# Patient Record
Sex: FEMALE | URBAN - METROPOLITAN AREA
[De-identification: names, ages, dates, MRNs, and addresses within clinical notes are randomized per-mention and may not be internally consistent; named-entity substitution may affect disease eponyms.]

---

## 2023-09-05 ENCOUNTER — OFFICE (OUTPATIENT)
Dept: URBAN - METROPOLITAN AREA CLINIC 121 | Facility: CLINIC | Age: 43
Setting detail: OPHTHALMOLOGY
End: 2023-09-05
Payer: COMMERCIAL

## 2023-09-05 DIAGNOSIS — H02.011: ICD-10-CM

## 2023-09-05 DIAGNOSIS — H02.014: ICD-10-CM

## 2023-09-05 DIAGNOSIS — Q10.3: ICD-10-CM

## 2023-09-05 DIAGNOSIS — H16.223: ICD-10-CM

## 2023-09-05 DIAGNOSIS — H18.831: ICD-10-CM

## 2023-09-05 DIAGNOSIS — H40.013: ICD-10-CM

## 2023-09-05 DIAGNOSIS — S05.02XA: ICD-10-CM

## 2023-09-05 PROCEDURE — 92012 INTRM OPH EXAM EST PATIENT: CPT | Performed by: OPHTHALMOLOGY

## 2023-09-05 ASSESSMENT — LID EXAM ASSESSMENTS
OS_TRICHIASIS: LUL
OS_COMMENTS: DISTICHIASIS LLL & LUL
OD_COMMENTS: DISTICHIASIS RLL & RUL
OD_TRICHIASIS: RUL

## 2023-09-05 ASSESSMENT — KERATOMETRY
OD_AXISANGLE_DEGREES: 096
OS_K2POWER_DIOPTERS: 45.75
OS_AXISANGLE_DEGREES: 061
OD_K1POWER_DIOPTERS: 45.25
OS_K1POWER_DIOPTERS: 45.00
OD_K2POWER_DIOPTERS: 46.00
METHOD_AUTO_MANUAL: AUTO

## 2023-09-05 ASSESSMENT — REFRACTION_AUTOREFRACTION
OS_AXIS: 023
OS_SPHERE: -1.25
OD_CYLINDER: +0.50
OD_AXIS: 053
OS_CYLINDER: +0.50
OD_SPHERE: -0.75

## 2023-09-05 ASSESSMENT — SUPERFICIAL PUNCTATE KERATITIS (SPK)
OD_SPK: T
OS_SPK: T

## 2023-09-05 ASSESSMENT — VISUAL ACUITY
OS_BCVA: 20/30
OD_BCVA: 20/25

## 2023-09-05 ASSESSMENT — CONFRONTATIONAL VISUAL FIELD TEST (CVF)
OD_FINDINGS: FULL
OS_FINDINGS: FULL

## 2023-09-05 ASSESSMENT — CORNEAL TRAUMA - ABRASION: OD_ABRASION: PRESENT

## 2023-09-05 ASSESSMENT — AXIALLENGTH_DERIVED
OD_AL: 23.0204
OS_AL: 23.2965

## 2023-09-05 ASSESSMENT — SPHEQUIV_DERIVED
OD_SPHEQUIV: -0.5
OS_SPHEQUIV: -1

## 2023-09-08 ENCOUNTER — OFFICE (OUTPATIENT)
Dept: URBAN - METROPOLITAN AREA CLINIC 121 | Facility: CLINIC | Age: 43
Setting detail: OPHTHALMOLOGY
End: 2023-09-08
Payer: COMMERCIAL

## 2023-09-08 DIAGNOSIS — H40.013: ICD-10-CM

## 2023-09-08 DIAGNOSIS — Q10.3: ICD-10-CM

## 2023-09-08 DIAGNOSIS — H02.012: ICD-10-CM

## 2023-09-08 DIAGNOSIS — H02.011: ICD-10-CM

## 2023-09-08 DIAGNOSIS — H18.831: ICD-10-CM

## 2023-09-08 DIAGNOSIS — H16.223: ICD-10-CM

## 2023-09-08 DIAGNOSIS — H02.015: ICD-10-CM

## 2023-09-08 DIAGNOSIS — H02.014: ICD-10-CM

## 2023-09-08 DIAGNOSIS — S05.02XA: ICD-10-CM

## 2023-09-08 PROCEDURE — 67820 REVISE EYELASHES: CPT | Performed by: OPHTHALMOLOGY

## 2023-09-08 PROCEDURE — 92012 INTRM OPH EXAM EST PATIENT: CPT | Performed by: OPHTHALMOLOGY

## 2023-09-08 ASSESSMENT — CONFRONTATIONAL VISUAL FIELD TEST (CVF)
OS_FINDINGS: FULL
OD_FINDINGS: FULL

## 2023-09-08 ASSESSMENT — LID EXAM ASSESSMENTS
OS_COMMENTS: DISTICHIASIS LLL & LUL
OD_TRICHIASIS: RUL
OS_TRICHIASIS: LUL
OD_COMMENTS: DISTICHIASIS RLL & RUL

## 2023-09-08 ASSESSMENT — KERATOMETRY
OD_K1POWER_DIOPTERS: 45.25
OS_AXISANGLE_DEGREES: 061
OD_AXISANGLE_DEGREES: 096
OD_K2POWER_DIOPTERS: 46.00
OS_K2POWER_DIOPTERS: 45.75
OS_K1POWER_DIOPTERS: 45.00
METHOD_AUTO_MANUAL: AUTO

## 2023-09-08 ASSESSMENT — VISUAL ACUITY
OS_BCVA: 20/30
OD_BCVA: 20/25

## 2023-09-08 ASSESSMENT — REFRACTION_AUTOREFRACTION
OS_SPHERE: -1.25
OD_CYLINDER: +0.50
OD_SPHERE: -0.75
OS_CYLINDER: +0.50
OS_AXIS: 023
OD_AXIS: 053

## 2023-09-08 ASSESSMENT — AXIALLENGTH_DERIVED
OD_AL: 23.0204
OS_AL: 23.2965

## 2023-09-08 ASSESSMENT — SUPERFICIAL PUNCTATE KERATITIS (SPK)
OS_SPK: T
OD_SPK: T

## 2023-09-08 ASSESSMENT — SPHEQUIV_DERIVED
OS_SPHEQUIV: -1
OD_SPHEQUIV: -0.5

## 2023-09-08 ASSESSMENT — CORNEAL TRAUMA - ABRASION: OD_ABRASION: PRESENT

## 2023-09-11 ENCOUNTER — OFFICE (OUTPATIENT)
Dept: URBAN - METROPOLITAN AREA CLINIC 121 | Facility: CLINIC | Age: 43
Setting detail: OPHTHALMOLOGY
End: 2023-09-11
Payer: COMMERCIAL

## 2023-09-11 DIAGNOSIS — H02.015: ICD-10-CM

## 2023-09-11 DIAGNOSIS — H02.011: ICD-10-CM

## 2023-09-11 DIAGNOSIS — H40.013: ICD-10-CM

## 2023-09-11 DIAGNOSIS — H02.014: ICD-10-CM

## 2023-09-11 DIAGNOSIS — H02.012: ICD-10-CM

## 2023-09-11 DIAGNOSIS — H16.223: ICD-10-CM

## 2023-09-11 DIAGNOSIS — Q10.3: ICD-10-CM

## 2023-09-11 DIAGNOSIS — H18.831: ICD-10-CM

## 2023-09-11 DIAGNOSIS — S05.02XD: ICD-10-CM

## 2023-09-11 PROCEDURE — 92012 INTRM OPH EXAM EST PATIENT: CPT | Performed by: OPHTHALMOLOGY

## 2023-09-11 ASSESSMENT — KERATOMETRY
METHOD_AUTO_MANUAL: AUTO
OD_K1POWER_DIOPTERS: 45.25
OS_K2POWER_DIOPTERS: 45.75
OD_K2POWER_DIOPTERS: 46.00
OS_AXISANGLE_DEGREES: 061
OD_AXISANGLE_DEGREES: 096
OS_K1POWER_DIOPTERS: 45.00

## 2023-09-11 ASSESSMENT — VISUAL ACUITY
OD_BCVA: 20/25
OS_BCVA: 20/30

## 2023-09-11 ASSESSMENT — SPHEQUIV_DERIVED
OS_SPHEQUIV: -1
OD_SPHEQUIV: -0.5

## 2023-09-11 ASSESSMENT — AXIALLENGTH_DERIVED
OS_AL: 23.2965
OD_AL: 23.0204

## 2023-09-11 ASSESSMENT — SUPERFICIAL PUNCTATE KERATITIS (SPK)
OD_SPK: T
OS_SPK: T

## 2023-09-11 ASSESSMENT — LID EXAM ASSESSMENTS
OD_COMMENTS: DISTICHIASIS RLL & RUL
OS_COMMENTS: DISTICHIASIS LLL & LUL
OS_TRICHIASIS: LUL
OD_TRICHIASIS: RUL

## 2023-09-11 ASSESSMENT — REFRACTION_AUTOREFRACTION
OS_SPHERE: -1.25
OD_AXIS: 053
OD_SPHERE: -0.75
OD_CYLINDER: +0.50
OS_AXIS: 023
OS_CYLINDER: +0.50

## 2023-09-11 ASSESSMENT — CONFRONTATIONAL VISUAL FIELD TEST (CVF)
OS_FINDINGS: FULL
OD_FINDINGS: FULL

## 2023-09-11 ASSESSMENT — CORNEAL TRAUMA - ABRASION: OD_ABRASION: PRESENT

## 2023-10-17 ENCOUNTER — OFFICE (OUTPATIENT)
Dept: URBAN - METROPOLITAN AREA CLINIC 121 | Facility: CLINIC | Age: 43
Setting detail: OPHTHALMOLOGY
End: 2023-10-17
Payer: COMMERCIAL

## 2023-10-17 DIAGNOSIS — H02.015: ICD-10-CM

## 2023-10-17 DIAGNOSIS — H16.223: ICD-10-CM

## 2023-10-17 DIAGNOSIS — H40.013: ICD-10-CM

## 2023-10-17 DIAGNOSIS — H02.014: ICD-10-CM

## 2023-10-17 DIAGNOSIS — Q10.3: ICD-10-CM

## 2023-10-17 DIAGNOSIS — H02.012: ICD-10-CM

## 2023-10-17 DIAGNOSIS — H02.011: ICD-10-CM

## 2023-10-17 PROCEDURE — 92012 INTRM OPH EXAM EST PATIENT: CPT | Performed by: OPHTHALMOLOGY

## 2023-10-17 ASSESSMENT — CONFRONTATIONAL VISUAL FIELD TEST (CVF)
OS_FINDINGS: FULL
OD_FINDINGS: FULL

## 2023-10-17 ASSESSMENT — KERATOMETRY
OS_K2POWER_DIOPTERS: 45.75
OD_K1POWER_DIOPTERS: 45.25
OD_K2POWER_DIOPTERS: 46.00
OS_AXISANGLE_DEGREES: 061
METHOD_AUTO_MANUAL: AUTO
OS_K1POWER_DIOPTERS: 45.00
OD_AXISANGLE_DEGREES: 096

## 2023-10-17 ASSESSMENT — AXIALLENGTH_DERIVED
OD_AL: 23.0204
OS_AL: 23.2965

## 2023-10-17 ASSESSMENT — SUPERFICIAL PUNCTATE KERATITIS (SPK)
OS_SPK: T
OD_SPK: T

## 2023-10-17 ASSESSMENT — SPHEQUIV_DERIVED
OS_SPHEQUIV: -1
OD_SPHEQUIV: -0.5

## 2023-10-17 ASSESSMENT — LID EXAM ASSESSMENTS
OD_TRICHIASIS: RUL
OS_COMMENTS: DISTICHIASIS LLL & LUL
OS_TRICHIASIS: LUL
OD_COMMENTS: DISTICHIASIS RLL & RUL

## 2023-10-17 ASSESSMENT — REFRACTION_AUTOREFRACTION
OS_CYLINDER: +0.50
OD_SPHERE: -0.75
OD_AXIS: 053
OS_AXIS: 023
OS_SPHERE: -1.25
OD_CYLINDER: +0.50

## 2023-10-17 ASSESSMENT — VISUAL ACUITY
OS_BCVA: 20/30
OD_BCVA: 20/25

## 2023-10-17 ASSESSMENT — CORNEAL TRAUMA - ABRASION: OD_ABRASION: PRESENT

## 2023-12-15 ENCOUNTER — OFFICE (OUTPATIENT)
Dept: URBAN - METROPOLITAN AREA CLINIC 121 | Facility: CLINIC | Age: 43
Setting detail: OPHTHALMOLOGY
End: 2023-12-15
Payer: COMMERCIAL

## 2023-12-15 DIAGNOSIS — H16.223: ICD-10-CM

## 2023-12-15 DIAGNOSIS — Q10.3: ICD-10-CM

## 2023-12-15 DIAGNOSIS — H02.011: ICD-10-CM

## 2023-12-15 DIAGNOSIS — H02.012: ICD-10-CM

## 2023-12-15 DIAGNOSIS — H02.015: ICD-10-CM

## 2023-12-15 DIAGNOSIS — H02.014: ICD-10-CM

## 2023-12-15 DIAGNOSIS — H40.013: ICD-10-CM

## 2023-12-15 PROCEDURE — 92012 INTRM OPH EXAM EST PATIENT: CPT | Performed by: OPHTHALMOLOGY

## 2023-12-15 ASSESSMENT — REFRACTION_AUTOREFRACTION
OD_SPHERE: -0.75
OS_SPHERE: -1.25
OD_AXIS: 053
OS_AXIS: 023
OD_CYLINDER: +0.50
OS_CYLINDER: +0.50

## 2023-12-15 ASSESSMENT — SUPERFICIAL PUNCTATE KERATITIS (SPK)
OD_SPK: T
OS_SPK: T

## 2023-12-15 ASSESSMENT — CONFRONTATIONAL VISUAL FIELD TEST (CVF)
OD_FINDINGS: FULL
OS_FINDINGS: FULL

## 2023-12-15 ASSESSMENT — SPHEQUIV_DERIVED
OS_SPHEQUIV: -1
OD_SPHEQUIV: -0.5

## 2023-12-15 ASSESSMENT — LID EXAM ASSESSMENTS
OD_TRICHIASIS: RUL
OD_COMMENTS: DISTICHIASIS RLL & RUL
OS_TRICHIASIS: LUL
OS_COMMENTS: DISTICHIASIS LLL & LUL

## 2024-04-12 ENCOUNTER — OFFICE (OUTPATIENT)
Dept: URBAN - METROPOLITAN AREA CLINIC 121 | Facility: CLINIC | Age: 44
Setting detail: OPHTHALMOLOGY
End: 2024-04-12
Payer: COMMERCIAL

## 2024-04-12 DIAGNOSIS — H40.013: ICD-10-CM

## 2024-04-12 PROCEDURE — 99212 OFFICE O/P EST SF 10 MIN: CPT | Performed by: OPHTHALMOLOGY

## 2024-04-12 PROCEDURE — 92133 CPTRZD OPH DX IMG PST SGM ON: CPT | Performed by: OPHTHALMOLOGY

## 2024-04-12 PROCEDURE — 92083 EXTENDED VISUAL FIELD XM: CPT | Performed by: OPHTHALMOLOGY

## 2024-04-12 ASSESSMENT — LID EXAM ASSESSMENTS
OD_COMMENTS: DISTICHIASIS RLL & RUL
OD_TRICHIASIS: RUL
OS_TRICHIASIS: LUL
OS_COMMENTS: DISTICHIASIS LLL & LUL

## 2024-08-27 ENCOUNTER — NON-APPOINTMENT (OUTPATIENT)
Age: 44
End: 2024-08-27

## 2024-08-28 ENCOUNTER — OFFICE (OUTPATIENT)
Dept: URBAN - METROPOLITAN AREA CLINIC 121 | Facility: CLINIC | Age: 44
Setting detail: OPHTHALMOLOGY
End: 2024-08-28
Payer: COMMERCIAL

## 2024-08-28 ENCOUNTER — RX ONLY (RX ONLY)
Age: 44
End: 2024-08-28

## 2024-08-28 ENCOUNTER — TRANSCRIPTION ENCOUNTER (OUTPATIENT)
Age: 44
End: 2024-08-28

## 2024-08-28 ENCOUNTER — APPOINTMENT (OUTPATIENT)
Dept: PULMONOLOGY | Facility: CLINIC | Age: 44
End: 2024-08-28
Payer: COMMERCIAL

## 2024-08-28 VITALS
TEMPERATURE: 97.9 F | WEIGHT: 250 LBS | BODY MASS INDEX: 42.68 KG/M2 | HEART RATE: 80 BPM | HEIGHT: 64 IN | RESPIRATION RATE: 16 BRPM | DIASTOLIC BLOOD PRESSURE: 72 MMHG | OXYGEN SATURATION: 97 % | SYSTOLIC BLOOD PRESSURE: 122 MMHG

## 2024-08-28 DIAGNOSIS — H16.223: ICD-10-CM

## 2024-08-28 DIAGNOSIS — H02.015: ICD-10-CM

## 2024-08-28 DIAGNOSIS — J45.40 MODERATE PERSISTENT ASTHMA, UNCOMPLICATED: ICD-10-CM

## 2024-08-28 DIAGNOSIS — G47.33 OBSTRUCTIVE SLEEP APNEA (ADULT) (PEDIATRIC): ICD-10-CM

## 2024-08-28 DIAGNOSIS — Z86.69 PERSONAL HISTORY OF OTHER DISEASES OF THE NERVOUS SYSTEM AND SENSE ORGANS: ICD-10-CM

## 2024-08-28 DIAGNOSIS — Q10.3: ICD-10-CM

## 2024-08-28 DIAGNOSIS — A63.0 ANOGENITAL (VENEREAL) WARTS: ICD-10-CM

## 2024-08-28 DIAGNOSIS — F32.A DEPRESSION, UNSPECIFIED: ICD-10-CM

## 2024-08-28 DIAGNOSIS — M72.2 PLANTAR FASCIAL FIBROMATOSIS: ICD-10-CM

## 2024-08-28 DIAGNOSIS — M67.859: ICD-10-CM

## 2024-08-28 DIAGNOSIS — Z87.42 PERSONAL HISTORY OF OTHER DISEASES OF THE FEMALE GENITAL TRACT: ICD-10-CM

## 2024-08-28 DIAGNOSIS — H10.45: ICD-10-CM

## 2024-08-28 DIAGNOSIS — R06.83 SNORING: ICD-10-CM

## 2024-08-28 DIAGNOSIS — H40.013: ICD-10-CM

## 2024-08-28 DIAGNOSIS — F41.9 ANXIETY DISORDER, UNSPECIFIED: ICD-10-CM

## 2024-08-28 DIAGNOSIS — H02.012: ICD-10-CM

## 2024-08-28 DIAGNOSIS — I47.10 SUPRAVENTRICULAR TACHYCARDIA, UNSPECIFIED: ICD-10-CM

## 2024-08-28 DIAGNOSIS — H02.014: ICD-10-CM

## 2024-08-28 DIAGNOSIS — Z87.39 PERSONAL HISTORY OF OTHER DISEASES OF THE MUSCULOSKELETAL SYSTEM AND CONNECTIVE TISSUE: ICD-10-CM

## 2024-08-28 DIAGNOSIS — H02.011: ICD-10-CM

## 2024-08-28 PROBLEM — Z00.00 ENCOUNTER FOR PREVENTIVE HEALTH EXAMINATION: Status: ACTIVE | Noted: 2024-08-28

## 2024-08-28 PROCEDURE — 92014 COMPRE OPH EXAM EST PT 1/>: CPT | Performed by: OPHTHALMOLOGY

## 2024-08-28 PROCEDURE — 99203 OFFICE O/P NEW LOW 30 MIN: CPT

## 2024-08-28 RX ORDER — ALPRAZOLAM 0.25 MG/1
0.25 TABLET ORAL
Refills: 0 | Status: ACTIVE | COMMUNITY

## 2024-08-28 RX ORDER — TRAMADOL HYDROCHLORIDE 25 MG/1
TABLET, COATED ORAL
Refills: 0 | Status: ACTIVE | COMMUNITY

## 2024-08-28 RX ORDER — LEVALBUTEROL TARTRATE 45 UG/1
45 AEROSOL, METERED ORAL
Refills: 0 | Status: ACTIVE | COMMUNITY

## 2024-08-28 RX ORDER — MULTIVIT-MIN/IRON/FOLIC ACID/K 18-600-40
50 MCG CAPSULE ORAL DAILY
Refills: 0 | Status: ACTIVE | COMMUNITY

## 2024-08-28 RX ORDER — SERTRALINE HYDROCHLORIDE 50 MG/1
50 TABLET, FILM COATED ORAL DAILY
Refills: 0 | Status: ACTIVE | COMMUNITY

## 2024-08-28 RX ORDER — TIZANIDINE HYDROCHLORIDE 4 MG/1
4 CAPSULE ORAL
Refills: 0 | Status: ACTIVE | COMMUNITY

## 2024-08-28 RX ORDER — MONTELUKAST 10 MG/1
10 TABLET, FILM COATED ORAL DAILY
Refills: 0 | Status: ACTIVE | COMMUNITY

## 2024-08-28 RX ORDER — BUDESONIDE AND FORMOTEROL FUMARATE DIHYDRATE 160; 4.5 UG/1; UG/1
160-4.5 AEROSOL RESPIRATORY (INHALATION) TWICE DAILY
Refills: 0 | Status: ACTIVE | COMMUNITY

## 2024-08-28 RX ORDER — MULTIVIT-MIN/IRON/FOLIC ACID/K 18-600-40
400 CAPSULE ORAL DAILY
Refills: 0 | Status: ACTIVE | COMMUNITY

## 2024-08-28 RX ORDER — BUPROPION HYDROCHLORIDE 300 MG/1
300 TABLET, EXTENDED RELEASE ORAL DAILY
Refills: 0 | Status: ACTIVE | COMMUNITY

## 2024-08-28 RX ORDER — GABAPENTIN 300 MG
300 TABLET ORAL
Refills: 0 | Status: ACTIVE | COMMUNITY

## 2024-08-28 RX ORDER — FEXOFENADINE HCL 180 MG
180 TABLET ORAL EVERY MORNING
Refills: 0 | Status: ACTIVE | COMMUNITY

## 2024-08-28 RX ORDER — PANTOPRAZOLE 40 MG/1
40 TABLET, DELAYED RELEASE ORAL DAILY
Refills: 0 | Status: ACTIVE | COMMUNITY

## 2024-08-28 ASSESSMENT — CONFRONTATIONAL VISUAL FIELD TEST (CVF)
OS_FINDINGS: FULL
OD_FINDINGS: FULL

## 2024-08-28 ASSESSMENT — LID EXAM ASSESSMENTS
OS_TRICHIASIS: LUL
OD_TRICHIASIS: RUL
OS_COMMENTS: DISTICHIASIS LLL & LUL
OD_COMMENTS: DISTICHIASIS RLL & RUL

## 2024-08-28 NOTE — HISTORY OF PRESENT ILLNESS
[FreeTextEntry1] : Dr. Marina Bateman 44 year old woman with history of anxiety, asthma, back pain s.p surgery is here in the sleep center to address sleep apnea.  Patient was diagnosed with sleep apnea in the past and is not able to tolerate CPAP therapy, I have looked at the download from 2022 patient's machine is not working well tends on times off all night.  Patient is snoring even while wearing the CPAP.  This could be because of the machine turning on turning off which probably is from excessive mask leak as well.  Will do a split study to assess the degree of sleep apnea and find a mask that helps improve her apnea.   Patient is sleepy with [place] sleepiness score of 14.  Patient has very loud snoring even with cpap, does not have any witnessed apneas.  Patient's bedtime is around 10 PM to 2 AM wakes up in the morning around 8-9 AM.   She feels tired when she wakes up.  Patient drinks 1 cup of coffee during the daytime. Patient does not have any headaches or nocturia. She is not sleepy while driving.  She is on cpap and has difficulty with the mask, currently on fullface mask. standing/toileting/walking

## 2024-08-28 NOTE — ASSESSMENT
[FreeTextEntry1] : Split Sleep Study:  Objective: To assess the current degree of sleep apnea and determine the appropriate CPAP settings. This study will help identify whether the sleep apnea is being adequately managed with the current CPAP therapy and if there are significant issues with the current mask or machine.  CPAP Therapy Adjustment:  Mask Evaluation: Explore alternative CPAP masks that may reduce leaks and improve comfort. Consider options such as nasal pillows or nasal masks if the full-face mask is problematic.  Sleep Schedule: Reinforce the importance of maintaining a consistent sleep schedule to improve sleep quality.  Monitor: Schedule follow-up appointments to review the results of the split study and adjust CPAP therapy based on findings.  Wieght loss discussed. Also talked about coming of tramadol if possible.

## 2024-08-30 ENCOUNTER — TRANSCRIPTION ENCOUNTER (OUTPATIENT)
Age: 44
End: 2024-08-30

## 2024-11-08 ENCOUNTER — OFFICE (OUTPATIENT)
Facility: LOCATION | Age: 44
Setting detail: OPHTHALMOLOGY
End: 2024-11-08
Payer: COMMERCIAL

## 2024-11-08 DIAGNOSIS — H02.012: ICD-10-CM

## 2024-11-08 DIAGNOSIS — H10.45: ICD-10-CM

## 2024-11-08 DIAGNOSIS — H02.015: ICD-10-CM

## 2024-11-08 DIAGNOSIS — Q10.3: ICD-10-CM

## 2024-11-08 DIAGNOSIS — H02.014: ICD-10-CM

## 2024-11-08 DIAGNOSIS — H02.011: ICD-10-CM

## 2024-11-08 DIAGNOSIS — H40.013: ICD-10-CM

## 2024-11-08 DIAGNOSIS — H16.223: ICD-10-CM

## 2024-11-08 PROCEDURE — 99214 OFFICE O/P EST MOD 30 MIN: CPT | Performed by: OPHTHALMOLOGY

## 2024-11-08 PROCEDURE — 92250 FUNDUS PHOTOGRAPHY W/I&R: CPT | Performed by: OPHTHALMOLOGY

## 2024-11-08 ASSESSMENT — PACHYMETRY
OS_CT_UM: 550
OD_CT_CORRECTION: -1
OS_CT_CORRECTION: -1
OD_CT_UM: 550

## 2024-11-08 ASSESSMENT — KERATOMETRY
OS_AXISANGLE_DEGREES: 035
OS_K2POWER_DIOPTERS: 45.75
OD_K2POWER_DIOPTERS: 46.25
OD_K1POWER_DIOPTERS: 45.75
OS_K1POWER_DIOPTERS: 45.25
OD_AXISANGLE_DEGREES: 094
METHOD_AUTO_MANUAL: AUTO

## 2024-11-08 ASSESSMENT — CONFRONTATIONAL VISUAL FIELD TEST (CVF)
OD_FINDINGS: FULL
OS_FINDINGS: FULL

## 2024-11-08 ASSESSMENT — REFRACTION_AUTOREFRACTION
OS_AXIS: 40
OD_AXIS: 036
OD_SPHERE: -0.25
OS_SPHERE: +0.75
OS_CYLINDER: +0.75
OD_CYLINDER: +0.50

## 2024-11-08 ASSESSMENT — LID EXAM ASSESSMENTS
OS_COMMENTS: DISTICHIASIS LLL & LUL
OD_COMMENTS: DISTICHIASIS RLL & RUL
OS_TRICHIASIS: LUL
OD_TRICHIASIS: RUL

## 2024-11-08 ASSESSMENT — VISUAL ACUITY
OD_BCVA: 20/25
OS_BCVA: 20/25

## 2024-11-08 ASSESSMENT — TONOMETRY
OD_IOP_MMHG: 16
OS_IOP_MMHG: 16

## 2024-11-08 ASSESSMENT — SUPERFICIAL PUNCTATE KERATITIS (SPK)
OD_SPK: 1+
OS_SPK: 1+

## 2025-01-21 DIAGNOSIS — E66.01 MORBID (SEVERE) OBESITY DUE TO EXCESS CALORIES: ICD-10-CM

## 2025-01-21 RX ORDER — TIRZEPATIDE 2.5 MG/.5ML
2.5 INJECTION, SOLUTION SUBCUTANEOUS
Qty: 4 | Refills: 1 | Status: ACTIVE | COMMUNITY
Start: 2025-01-21 | End: 1900-01-01

## 2025-01-23 ENCOUNTER — TRANSCRIPTION ENCOUNTER (OUTPATIENT)
Age: 45
End: 2025-01-23

## 2025-02-11 ENCOUNTER — TRANSCRIPTION ENCOUNTER (OUTPATIENT)
Age: 45
End: 2025-02-11

## 2025-02-14 ENCOUNTER — OFFICE (OUTPATIENT)
Facility: LOCATION | Age: 45
Setting detail: OPHTHALMOLOGY
End: 2025-02-14
Payer: COMMERCIAL

## 2025-02-14 DIAGNOSIS — H02.012: ICD-10-CM

## 2025-02-14 DIAGNOSIS — H16.223: ICD-10-CM

## 2025-02-14 DIAGNOSIS — H02.011: ICD-10-CM

## 2025-02-14 DIAGNOSIS — H40.013: ICD-10-CM

## 2025-02-14 PROCEDURE — 92012 INTRM OPH EXAM EST PATIENT: CPT | Performed by: OPHTHALMOLOGY

## 2025-02-14 ASSESSMENT — KERATOMETRY
METHOD_AUTO_MANUAL: AUTO
OS_AXISANGLE_DEGREES: 035
OD_K1POWER_DIOPTERS: 45.75
OS_K2POWER_DIOPTERS: 45.75
OS_K1POWER_DIOPTERS: 45.25
OD_AXISANGLE_DEGREES: 094
OD_K2POWER_DIOPTERS: 46.25

## 2025-02-14 ASSESSMENT — REFRACTION_AUTOREFRACTION
OS_AXIS: 40
OS_CYLINDER: +0.75
OD_CYLINDER: +0.50
OD_AXIS: 036
OD_SPHERE: -0.25
OS_SPHERE: +0.75

## 2025-02-14 ASSESSMENT — PACHYMETRY
OD_CT_CORRECTION: -1
OD_CT_UM: 550
OS_CT_UM: 550
OS_CT_CORRECTION: -1

## 2025-02-14 ASSESSMENT — VISUAL ACUITY
OD_BCVA: 20/25
OS_BCVA: 20/25

## 2025-02-14 ASSESSMENT — TONOMETRY
OS_IOP_MMHG: 16
OD_IOP_MMHG: 16

## 2025-02-14 ASSESSMENT — LID EXAM ASSESSMENTS
OD_COMMENTS: DISTICHIASIS RLL & RUL
OS_TRICHIASIS: LUL
OD_TRICHIASIS: RUL
OS_COMMENTS: DISTICHIASIS LLL & LUL

## 2025-02-14 ASSESSMENT — SUPERFICIAL PUNCTATE KERATITIS (SPK)
OS_SPK: 1+
OD_SPK: 1+

## 2025-02-14 ASSESSMENT — CONFRONTATIONAL VISUAL FIELD TEST (CVF)
OD_FINDINGS: FULL
OS_FINDINGS: FULL

## 2025-03-10 ENCOUNTER — NON-APPOINTMENT (OUTPATIENT)
Age: 45
End: 2025-03-10

## 2025-03-10 ENCOUNTER — APPOINTMENT (OUTPATIENT)
Dept: HEART AND VASCULAR | Facility: CLINIC | Age: 45
End: 2025-03-10
Payer: COMMERCIAL

## 2025-03-10 VITALS
WEIGHT: 265 LBS | OXYGEN SATURATION: 97 % | HEART RATE: 80 BPM | HEIGHT: 64 IN | RESPIRATION RATE: 16 BRPM | TEMPERATURE: 97 F | DIASTOLIC BLOOD PRESSURE: 68 MMHG | SYSTOLIC BLOOD PRESSURE: 134 MMHG | BODY MASS INDEX: 45.24 KG/M2

## 2025-03-10 DIAGNOSIS — I47.10 SUPRAVENTRICULAR TACHYCARDIA, UNSPECIFIED: ICD-10-CM

## 2025-03-10 DIAGNOSIS — Z82.49 FAMILY HISTORY OF ISCHEMIC HEART DISEASE AND OTHER DISEASES OF THE CIRCULATORY SYSTEM: ICD-10-CM

## 2025-03-10 DIAGNOSIS — Z72.0 TOBACCO USE: ICD-10-CM

## 2025-03-10 DIAGNOSIS — R06.02 SHORTNESS OF BREATH: ICD-10-CM

## 2025-03-10 DIAGNOSIS — R07.89 OTHER CHEST PAIN: ICD-10-CM

## 2025-03-10 DIAGNOSIS — R00.2 PALPITATIONS: ICD-10-CM

## 2025-03-10 PROCEDURE — 93000 ELECTROCARDIOGRAM COMPLETE: CPT | Mod: 59

## 2025-03-10 PROCEDURE — 93242 EXT ECG>48HR<7D RECORDING: CPT

## 2025-03-10 PROCEDURE — 93244 EXT ECG>48HR<7D REV&INTERPJ: CPT

## 2025-03-10 PROCEDURE — 99204 OFFICE O/P NEW MOD 45 MIN: CPT

## 2025-04-04 ENCOUNTER — TRANSCRIPTION ENCOUNTER (OUTPATIENT)
Age: 45
End: 2025-04-04

## 2025-04-08 ENCOUNTER — APPOINTMENT (OUTPATIENT)
Dept: HEART AND VASCULAR | Facility: CLINIC | Age: 45
End: 2025-04-08
Payer: COMMERCIAL

## 2025-04-08 ENCOUNTER — NON-APPOINTMENT (OUTPATIENT)
Age: 45
End: 2025-04-08

## 2025-04-08 VITALS
DIASTOLIC BLOOD PRESSURE: 90 MMHG | SYSTOLIC BLOOD PRESSURE: 124 MMHG | BODY MASS INDEX: 45.41 KG/M2 | HEART RATE: 82 BPM | OXYGEN SATURATION: 98 % | RESPIRATION RATE: 16 BRPM | WEIGHT: 266 LBS | HEIGHT: 64 IN

## 2025-04-08 DIAGNOSIS — R07.89 OTHER CHEST PAIN: ICD-10-CM

## 2025-04-08 DIAGNOSIS — R06.02 SHORTNESS OF BREATH: ICD-10-CM

## 2025-04-08 PROCEDURE — 99214 OFFICE O/P EST MOD 30 MIN: CPT | Mod: 25

## 2025-04-08 PROCEDURE — 93015 CV STRESS TEST SUPVJ I&R: CPT

## 2025-04-08 PROCEDURE — 93306 TTE W/DOPPLER COMPLETE: CPT

## 2025-04-22 ENCOUNTER — TRANSCRIPTION ENCOUNTER (OUTPATIENT)
Age: 45
End: 2025-04-22

## 2025-06-10 ENCOUNTER — NON-APPOINTMENT (OUTPATIENT)
Age: 45
End: 2025-06-10

## 2025-06-27 ENCOUNTER — OFFICE (OUTPATIENT)
Facility: LOCATION | Age: 45
Setting detail: OPHTHALMOLOGY
End: 2025-06-27
Payer: COMMERCIAL

## 2025-06-27 DIAGNOSIS — H02.011: ICD-10-CM

## 2025-06-27 DIAGNOSIS — H40.013: ICD-10-CM

## 2025-06-27 DIAGNOSIS — H02.012: ICD-10-CM

## 2025-06-27 DIAGNOSIS — H16.223: ICD-10-CM

## 2025-06-27 PROCEDURE — 92014 COMPRE OPH EXAM EST PT 1/>: CPT | Performed by: OPHTHALMOLOGY

## 2025-06-27 PROCEDURE — 92250 FUNDUS PHOTOGRAPHY W/I&R: CPT | Performed by: OPHTHALMOLOGY

## 2025-06-27 ASSESSMENT — SUPERFICIAL PUNCTATE KERATITIS (SPK)
OD_SPK: T
OS_SPK: T

## 2025-06-27 ASSESSMENT — PACHYMETRY
OD_CT_CORRECTION: -1
OD_CT_UM: 550
OS_CT_CORRECTION: -1
OS_CT_UM: 550

## 2025-06-27 ASSESSMENT — REFRACTION_AUTOREFRACTION
OS_SPHERE: -0.25
OS_AXIS: 021
OD_AXIS: 025
OD_SPHERE: -0.25
OD_CYLINDER: +0.50
OS_CYLINDER: +0.25

## 2025-06-27 ASSESSMENT — KERATOMETRY
OD_K1POWER_DIOPTERS: 45.50
OD_AXISANGLE_DEGREES: 092
OS_K1POWER_DIOPTERS: 45.00
OD_K2POWER_DIOPTERS: 45.75
OS_AXISANGLE_DEGREES: 056
METHOD_AUTO_MANUAL: AUTO
OS_K2POWER_DIOPTERS: 45.50

## 2025-06-27 ASSESSMENT — VISUAL ACUITY
OD_BCVA: 20/20
OS_BCVA: 20/20

## 2025-06-27 ASSESSMENT — LID EXAM ASSESSMENTS
OS_COMMENTS: DISTICHIASIS LLL & LUL
OD_COMMENTS: DISTICHIASIS RLL & RUL
OD_TRICHIASIS: RUL
OS_TRICHIASIS: LUL

## 2025-06-27 ASSESSMENT — CONFRONTATIONAL VISUAL FIELD TEST (CVF)
OD_FINDINGS: FULL
OS_FINDINGS: FULL

## 2025-06-27 ASSESSMENT — TONOMETRY
OS_IOP_MMHG: 14
OD_IOP_MMHG: 14